# Patient Record
Sex: FEMALE | Race: WHITE | NOT HISPANIC OR LATINO
[De-identification: names, ages, dates, MRNs, and addresses within clinical notes are randomized per-mention and may not be internally consistent; named-entity substitution may affect disease eponyms.]

---

## 2017-01-12 ENCOUNTER — APPOINTMENT (OUTPATIENT)
Dept: OPHTHALMOLOGY | Facility: CLINIC | Age: 47
End: 2017-01-12

## 2017-01-12 ENCOUNTER — OUTPATIENT (OUTPATIENT)
Dept: OUTPATIENT SERVICES | Facility: HOSPITAL | Age: 47
LOS: 1 days | End: 2017-01-12

## 2017-01-12 DIAGNOSIS — H52.13 MYOPIA, BILATERAL: ICD-10-CM

## 2020-06-08 ENCOUNTER — APPOINTMENT (OUTPATIENT)
Dept: GASTROENTEROLOGY | Facility: CLINIC | Age: 50
End: 2020-06-08
Payer: COMMERCIAL

## 2020-06-08 DIAGNOSIS — R93.3 ABNORMAL FINDINGS ON DIAGNOSTIC IMAGING OF OTHER PARTS OF DIGESTIVE TRACT: ICD-10-CM

## 2020-06-08 PROCEDURE — 99204 OFFICE O/P NEW MOD 45 MIN: CPT | Mod: 95

## 2020-06-10 PROBLEM — R93.3 ABNORMAL FINDING ON GI TRACT IMAGING: Status: ACTIVE | Noted: 2020-06-10

## 2020-07-26 NOTE — ASSESSMENT
[FreeTextEntry1] : 50F w/ hx of mitral valvulopasty, septal repair and hx of open abdominal surgery for abdominal pain p/f evaluation of submucosal lesion in the duodenum following EGD.  \par \par #Submucosal Lesion in the Duodenum\par Patient found to have a 1 cm submucosal lesion in the duodenum following recent EGD for abdominal pain with Dr. Davey.  Biopsy was unrevealing, notable for normal architecture.  Discussed at length the importance of further evaluation with EUS.  Patient remains hesitant due to prior experience at OSH and would like to continue to consider following information about billing with COVID19 PCR prior to procedure.\par -R/B/A of EUS discussed at length, patient concern about billing, recent concussion and COVID19 pandemic\par -Will have team reach out about billing\par \par Matias Jovel, PGY4\par Gastroenterology Fellow\par \par Addendum: recurrence of concussion symptoms potentially and will be seeing the head trauma team hopefully this week to evaluate, will keep EUS EGD appt for now as is

## 2020-07-26 NOTE — HISTORY OF PRESENT ILLNESS
[Heartburn] : improved heartburn [Nausea] : denies nausea [Vomiting] : denies vomiting [Diarrhea] : denies diarrhea [Constipation] : denies constipation [Abdominal Pain] : denies abdominal pain [Yellow Skin Or Eyes (Jaundice)] : denies jaundice [Rectal Pain] : denies rectal pain [Abdominal Swelling] : denies abdominal swelling [Home] : at home, [unfilled] , at the time of the visit. [Medical Office: ([facility])___] : at the medical office located in  [Verbal consent obtained from patient] : the patient, [unfilled] [de-identified] : 50F w/ hx of mitral valvulopasty, septal repair and hx of open abdominal surgery for abdominal pain p/f evaluation of submucosal lesion in the duodenum following EGD.  Referred by Dr. Davey for evaluation of EUS.\par \par Chronic left sided abd pain following surgery, followed by Dr. Davey.  Usually in the evening with bloating.  Bloating can persist even in the morning.  On low FODMAP and exercise regularly.  Recent concussion 1/18/20 requiring NSAIDs.  Occasional advil.  \par \par Around 1-2 bowel movement, formed.  Denies melena or hematochezia.  Denies fever, chill, cp, sob, dysphagia, odynophagia, recent wt loss, change in bowel movement, recent travel or change in medication\par \par ALL: Sulfa (fever and rash)\par Med: Occasional advil\par PSHx: Lap for endometriosis 1998, open heart surgery for septal defect and mitral valvuloplasty 2008, open surgery for abdominal pain 2013, \par Social Hx: Work in [employer], never smoke, social alcohol use, and occasional small portion of CBD edible.  \par Fam hx: Adopted.  \par \par EGD 5/19 w/ Dr. Matias Davey: Z-line was regular and was found 45 cm from the incisors.  Patchy mildly erythematous mucosa in the gastric body and antrum s/p biopsy (Path: mild chronic gastritis negative for H pylori and IM) Multiple small sessile polyps in the gastric body s/p cold snare polypectomy (path fundic gland polyp), single 10 mm submucosal nodule in the second portion of the duodenum distal to the major papilla, s/p biopsy (normal architecture)\par \par Colonoscopy 5/19 w/ Dr. Matias Davey:  TI biopsied (pathology normal villous structure) Diverticulosis of the sigmoid colon.  Internal hemorrhoid.  \par \par \par  [Wt Loss ___ Lbs] : no recent weight loss

## 2021-05-06 ENCOUNTER — OUTPATIENT (OUTPATIENT)
Dept: OUTPATIENT SERVICES | Facility: HOSPITAL | Age: 51
LOS: 1 days | End: 2021-05-06

## 2021-05-06 ENCOUNTER — APPOINTMENT (OUTPATIENT)
Dept: OPHTHALMOLOGY | Facility: CLINIC | Age: 51
End: 2021-05-06

## 2021-05-06 DIAGNOSIS — H52.13 MYOPIA, BILATERAL: ICD-10-CM

## 2021-06-18 ENCOUNTER — APPOINTMENT (OUTPATIENT)
Age: 51
End: 2021-06-18

## 2022-07-01 ENCOUNTER — APPOINTMENT (OUTPATIENT)
Dept: GASTROENTEROLOGY | Facility: CLINIC | Age: 52
End: 2022-07-01

## 2022-07-28 ENCOUNTER — APPOINTMENT (OUTPATIENT)
Age: 52
End: 2022-07-28

## 2022-07-28 PROCEDURE — 43237 ENDOSCOPIC US EXAM ESOPH: CPT
